# Patient Record
Sex: MALE | Race: WHITE | NOT HISPANIC OR LATINO | Employment: UNEMPLOYED | URBAN - METROPOLITAN AREA
[De-identification: names, ages, dates, MRNs, and addresses within clinical notes are randomized per-mention and may not be internally consistent; named-entity substitution may affect disease eponyms.]

---

## 2018-10-18 ENCOUNTER — OFFICE VISIT (OUTPATIENT)
Dept: INTERNAL MEDICINE | Facility: MEDICAL CENTER | Age: 53
End: 2018-10-18
Payer: MEDICAID

## 2018-10-18 VITALS
DIASTOLIC BLOOD PRESSURE: 78 MMHG | HEIGHT: 70 IN | RESPIRATION RATE: 18 BRPM | WEIGHT: 197.2 LBS | OXYGEN SATURATION: 95 % | SYSTOLIC BLOOD PRESSURE: 144 MMHG | HEART RATE: 93 BPM | TEMPERATURE: 98.2 F | BODY MASS INDEX: 28.23 KG/M2

## 2018-10-18 DIAGNOSIS — I10 HYPERTENSION, UNSPECIFIED TYPE: ICD-10-CM

## 2018-10-18 DIAGNOSIS — Z13.220 SCREENING FOR LIPID DISORDERS: ICD-10-CM

## 2018-10-18 DIAGNOSIS — R10.9 ABDOMINAL DISCOMFORT: ICD-10-CM

## 2018-10-18 DIAGNOSIS — Z76.89 ESTABLISHING CARE WITH NEW DOCTOR, ENCOUNTER FOR: ICD-10-CM

## 2018-10-18 DIAGNOSIS — Z23 NEED FOR VACCINATION: ICD-10-CM

## 2018-10-18 DIAGNOSIS — G43.109 MIGRAINE WITH AURA AND WITHOUT STATUS MIGRAINOSUS, NOT INTRACTABLE: ICD-10-CM

## 2018-10-18 DIAGNOSIS — E66.3 OVERWEIGHT (BMI 25.0-29.9): ICD-10-CM

## 2018-10-18 PROCEDURE — 99204 OFFICE O/P NEW MOD 45 MIN: CPT | Mod: GC | Performed by: INTERNAL MEDICINE

## 2018-10-18 RX ORDER — NAPROXEN 500 MG/1
500 TABLET ORAL DAILY
COMMUNITY
Start: 2015-01-07

## 2018-10-18 ASSESSMENT — PATIENT HEALTH QUESTIONNAIRE - PHQ9: CLINICAL INTERPRETATION OF PHQ2 SCORE: 0

## 2018-10-18 ASSESSMENT — PAIN SCALES - GENERAL: PAINLEVEL: NO PAIN

## 2018-10-18 NOTE — PATIENT INSTRUCTIONS
Constipation, Adult  Constipation is when a person has fewer bowel movements in a week than normal, has difficulty having a bowel movement, or has stools that are dry, hard, or larger than normal. Constipation may be caused by an underlying condition. It may become worse with age if a person takes certain medicines and does not take in enough fluids.  Follow these instructions at home:  Eating and drinking  · Eat foods that have a lot of fiber, such as fresh fruits and vegetables, whole grains, and beans.  · Limit foods that are high in fat, low in fiber, or overly processed, such as french fries, hamburgers, cookies, candies, and soda.  · Drink enough fluid to keep your urine clear or pale yellow.  General instructions  · Exercise regularly or as told by your health care provider.  · Go to the restroom when you have the urge to go. Do not hold it in.  · Take over-the-counter and prescription medicines only as told by your health care provider. These include any fiber supplements.  · Practice pelvic floor retraining exercises, such as deep breathing while relaxing the lower abdomen and pelvic floor relaxation during bowel movements.  · Watch your condition for any changes.  · Keep all follow-up visits as told by your health care provider. This is important.  Contact a health care provider if:  · You have pain that gets worse.  · You have a fever.  · You do not have a bowel movement after 4 days.  · You vomit.  · You are not hungry.  · You lose weight.  · You are bleeding from the anus.  · You have thin, pencil-like stools.  Get help right away if:  · You have a fever and your symptoms suddenly get worse.  · You leak stool or have blood in your stool.  · Your abdomen is bloated.  · You have severe pain in your abdomen.  · You feel dizzy or you faint.  This information is not intended to replace advice given to you by your health care provider. Make sure you discuss any questions you have with your health care  provider.  Document Released: 09/15/2005 Document Revised: 07/07/2017 Document Reviewed: 06/07/2017  [employer] Patient Education © 2017 [employer]

## 2018-10-18 NOTE — PROGRESS NOTES
New Patient to Establish    Reason to establish: New patient to establish    CC:   - Establish care with a new physician  - Mild intermittent left lower quadrant abdominal pain, history of hernia repair on same location.  - Request laboratory work to check on dyslipidemia    HPI: Mr. Garcia is a 52 years old male who presents to the clinic for the previously mentioned reasons.  Past medical history is significant for non intractable migraine headache with aura.    Patient currently has no job and he is on medicaid. Reports that he is currently looking for a job and he is unhappy because he has no job right.  PHQ2 is Zero.    Mild intermittent left lower quadrant abdominal pain, history of hernia repair on same location:  Patient reports mild intermittent left lower quadrant abdominal pain over the last year. Pain doesn't correlate with food and does not improve with defecation. Patient reports inconsistent constipation and she reports he is not drinking sufficient amount of fluids.  The patient reports a history of hernia repair and his left lower quadrant abdominal wall, the patient is not sure about the time of hernia, we don't have the documents which time of hernia repair was done, the patient is planning to bring the documents for his hernia repair on next office visit, hernia repair was done in November 2015.   The pain is dull in nature, intermittent, 2-4/10 in severity, doesn't radiate anywhere else, patient couldn't correlated with any recent event, no associated nausea vomiting diarrhea, no associated fever or chills, denies any history of trauma. No previous history of diverticulitis, per patient colonoscopy was done in 2015 and was within normal limits.    Request laboratory work to check on dyslipidemia:  Patient reports a family history of dyslipidemia and heart attack and he is worried about having high blood cholesterol in his system. Patient denies a previous confirmatory laboratory work for  "dyslipidemia.   Last office visit was 2015.  Patient currently denies palpitations, shortness of breath, chest pain.      Patient Active Problem List    Diagnosis Date Noted   • Overweight (BMI 25.0-29.9) 10/18/2018   • Hypertension 10/18/2018       Past Medical History:   Diagnosis Date   • Overweight (BMI 25.0-29.9)        Current Outpatient Prescriptions   Medication Sig Dispense Refill   • naproxen (NAPROSYN) 500 MG Tab Take 500 mg by mouth every day.       No current facility-administered medications for this visit.        Allergies as of 10/18/2018   • (No Known Allergies)       Social History     Social History   • Marital status: Single     Spouse name: N/A   • Number of children: N/A   • Years of education: N/A     Occupational History   • Not on file.     Social History Main Topics   • Smoking status: Never Smoker   • Smokeless tobacco: Never Used   • Alcohol use No      Comment: Quit in 1994, was heavy drinker.   • Drug use: Yes     Types: Marijuana   • Sexual activity: Not on file     Other Topics Concern   • Not on file     Social History Narrative   • No narrative on file       Family History   Problem Relation Age of Onset   • Breast Cancer Mother    • Heart Attack Maternal Grandmother    • Alcohol abuse Maternal Grandmother    • Heart Attack Maternal Grandfather    • Alcohol abuse Maternal Grandfather        History reviewed. No pertinent surgical history.    ROS: As per HPI. Additional pertinent symptoms as noted below.    All others negative    /78 (BP Location: Right arm, Patient Position: Sitting, BP Cuff Size: Adult)   Pulse 93   Temp 36.8 °C (98.2 °F) (Temporal)   Resp 18   Ht 1.78 m (5' 10.08\")   Wt 89.4 kg (197 lb 3.2 oz)   SpO2 95%   BMI 28.23 kg/m²     Physical Exam  General:  Alert and oriented, No apparent distress.    Eyes: Pupils equal and reactive. No scleral icterus.    Throat: Clear no erythema or exudates noted.    Neck: Supple. No lymphadenopathy noted. Thyroid not " enlarged.    Lungs: Clear to auscultation and percussion bilaterally.    Cardiovascular: Regular rate and rhythm. No murmurs, rubs or gallops.    Abdomen:  Benign. Intact skin, no discoloration or hematoma, negative for Hernandez sign, negative rebound, negative for gray butler sign, negative hepatosplenomegaly, negative for hernia mass, negative cough test for hernia    Extremities: No clubbing, cyanosis, edema.    Skin: Clear. No rash or suspicious skin lesions noted.    Other:     Note: I have reviewed all pertinent labs and diagnostic tests associated with this visit with specific comments listed under the assessment and plan below    Assessment and Plan    1. Abdominal discomfort:  - Patient reports abdominal discomfort over the last year  - History of abdominal hernia repair in November 2015.  - Patient reports that the pain is dull in nature, intermittent, 2-4/10 in severity, doesn't radiate anywhere else, patient couldn't correlated with any recent event, no associated nausea vomiting diarrhea, no associated fever or chills,   - Denies any history of trauma. No previous history of diverticulitis,  - Per patient colonoscopy was done in 2015 and was within normal limits.   - Physical exam was pertinent negative, please refer to physical exam for more details  Plan  - Educated patient about sufficient hydration  - Educated the patient about including more fiber in his diet  - Educated the patient about continuously doing physical exercise  - Will follow-up with the patient in 3 weeks and if his condition did not improve we might consider doing tests like CT scan of his abdomen (the patient will bring the hernia repair documentation with him on next office visit)    2. High blood pressure, no previous readings to compare  - Day office visit blood pressure is 150/93, the repeated to be found 144/78 mmHg.  - Patient reports infrequent headaches but he has a history of migraines  - Patient not sure about his previous  blood pressure readings, last office visit was 2015 his blood pressure was fine at that time  - Denies chest pain, shortness of breath, palpitations, blurred vision.  Plan  - Educated the patient about hypertension  - Advised the patient to purchase a blood pressure machine   - Advised the patient to bring a blood pressure Log with him on next office visit in 3 weeks  - Advised the patient and continue doing aerobic physical exercise 30 minutes per day 5 days per week  - Advised the patient to continue on doing diet modification and including more fiber in diet    3. Overweight (BMI 25.0-29.9)  - Patient reports limited physical activity  - Pt is planning to do more aerobic physical activity and diet modification  Plan  - Encourage continuing on her physical activity 30 minutes per day 5 days per week  - Encourage continuing on diet modification and advised to include more fiber in diet    4. Screening for lipid disorders  - Patient reports family history of dyslipidemia and heart attacks  - No documented previous personal history of dyslipidemia  - No previous history of coronary artery disease  - Denies history of smoking  - Reports a previous history of alcohol abuse, quit in 1994  - Reports active marijuana smoking daily  - Physical exam is negative for xanthelasma, xanthoma, arcus senilis.  Plan  - Lipid panel, CMP  - Follow-up in 3 weeks    5. Need for vaccination  - Patient is a due for influenza vaccine, he'll get the influenza vaccine on next office visit  - Patient is not sure about Tdap Vaccine, he is going to bring his vaccination record with him on next office visit.    6. Establishing care with new doctor, encounter for  - Patient reports that he has no primary care physician and he would like to establish care with us.      Followup: Return in about 3 weeks (around 11/8/2018).    Risk Assessment (discuss potential complications a function of chronic problems): Risk assessment has been discussed with  the patient    Complexity (discuss number of co-morbidities): Comorbidities have been discussed with the patient    Signed by: Anthony Cullen M.D.

## 2018-11-09 ENCOUNTER — APPOINTMENT (OUTPATIENT)
Dept: INTERNAL MEDICINE | Facility: MEDICAL CENTER | Age: 53
End: 2018-11-09
Payer: MEDICAID

## 2021-03-15 DIAGNOSIS — Z23 NEED FOR VACCINATION: ICD-10-CM

## 2021-03-23 ENCOUNTER — IMMUNIZATION (OUTPATIENT)
Dept: FAMILY PLANNING/WOMEN'S HEALTH CLINIC | Facility: IMMUNIZATION CENTER | Age: 56
End: 2021-03-23
Payer: OTHER GOVERNMENT

## 2021-03-23 DIAGNOSIS — Z23 ENCOUNTER FOR VACCINATION: Primary | ICD-10-CM

## 2021-03-23 PROCEDURE — 91300 PFIZER SARS-COV-2 VACCINE: CPT

## 2021-03-23 PROCEDURE — 0001A PFIZER SARS-COV-2 VACCINE: CPT

## 2021-04-16 ENCOUNTER — IMMUNIZATION (OUTPATIENT)
Dept: FAMILY PLANNING/WOMEN'S HEALTH CLINIC | Facility: IMMUNIZATION CENTER | Age: 56
End: 2021-04-16
Attending: INTERNAL MEDICINE
Payer: OTHER GOVERNMENT

## 2021-04-16 DIAGNOSIS — Z23 ENCOUNTER FOR VACCINATION: Primary | ICD-10-CM

## 2021-04-16 PROCEDURE — 91300 PFIZER SARS-COV-2 VACCINE: CPT

## 2021-04-16 PROCEDURE — 0002A PFIZER SARS-COV-2 VACCINE: CPT
